# Patient Record
(demographics unavailable — no encounter records)

---

## 2024-10-10 NOTE — PHYSICAL EXAM
[FreeTextEntry1] : Head:  Normocephalic Neck: Supple nontender no carotid bruits.    Mental Status:  Alert Oriented X3 Speech normal and no aphasia or dysarthria.  Cranial Nerves:  PERRL, Fundi normal Visual Fields full  EOMI no diplopia no ptosis no nystagmus, V through XII intact.  Motor:  No drift, normal strength tone and coordination and no focal atrophy. No abnormal movements. No dysmetria.  Normal rapid alternating movements.   DTRs: Symmetric and 2+.  Plantars flexor.  No Clonus.  Sensory:  Normal testing with pin light touch and vibration and  Joint position sense.  Normal DSS to touch.  Gait:  Normal including tandem walking heel toe walking and Rhomberg.

## 2024-10-10 NOTE — ASSESSMENT
[FreeTextEntry1] : Impression: This 24-year-old right-handed female patient has a history of new onset headache beginning June 2024 for which she was seen in the [place] emergency room on 6/26/2024.  Description of the headaches suggest migraine without a definite aura.  Today's neurological exam is normal.  She was given Fioricet with limited benefit.  Recommendations: Discontinue Fioricet.  Begin sumatriptan 100 mg to be taken at onset and repeat in 2 hours as needed and as directed for recurrent headache.  She could take naproxen in the form of Aleve 1 to 2 tablets with sumatriptan as directed.  MRI of the brain noncontrast.  Office follow-up in 2 months or as needed.

## 2024-10-10 NOTE — HISTORY OF PRESENT ILLNESS
[FreeTextEntry1] : This patient is seen for an office consultation.  She is a 24-year-old right-handed female with a history of anxiety for which she receives Abilify.  She describes the onset in June 2024 headache which can occur in the average of 3 times a month.  When the headache occurs it can be severe 10/10.  She visited the emergency room on 6/26/2024 and was given intravenous medication with improvement.  She was prescribed Fioricet which she takes with a headache with incomplete relief.  The headache is usually biparietal and throbbing and can occur in the early morning or at the end of the day.  She can get a headache while using her computer at work.  There are no other specific triggering factors to the headache.  She had a routine ophthalmology exam in September 2024 and uses contact lenses.  She denies visual aura head trauma syncope seizure visual problems problems with speech or cognition or any motor or sensory complaints.  Past history other than anxiety treated with Abilify is basically unremarkable.  Family history remarkable for brother who has a diagnosis of seizure disorder though the medication has been discontinued and the etiology is unclear.

## 2024-10-23 NOTE — HISTORY OF PRESENT ILLNESS
[FreeTextEntry1] :  CPE  [de-identified] : Diet: healthy  Exercise: in the summer walking 2 miles per day (5,000 steps per day)  weight same since last physical in 2023    will be seeing [facility]   in interval -saw neurologist Dr. Walker, diagnosed with migraine headache - rx'd sumatriptan prn

## 2024-10-23 NOTE — ASSESSMENT
[FreeTextEntry1] : 24F c MARTA on abilify, migraine headache (2024), obesity here for cpe   check BW  physical ecg performed - NSR with Incomplete RBBB  advised FBSE with derm & can see for skin tag flu shot given today utd w/gyn exam & pap smear utd with neurology - diagnosed with migraine - going for MRI brain  advised to f/u with GI re: blood in stool upon wiping  rtc in 1year for cpe or prn

## 2024-10-23 NOTE — PHYSICAL EXAM
[No Acute Distress] : no acute distress [Well Nourished] : well nourished [PERRL] : pupils equal round and reactive to light [Normal Oropharynx] : the oropharynx was normal [Normal TMs] : both tympanic membranes were normal [Normal Nasal Mucosa] : the nasal mucosa was normal [No Lymphadenopathy] : no lymphadenopathy [Supple] : supple [Thyroid Normal, No Nodules] : the thyroid was normal and there were no nodules present [No Accessory Muscle Use] : no accessory muscle use [Clear to Auscultation] : lungs were clear to auscultation bilaterally [Regular Rhythm] : with a regular rhythm [Normal S1, S2] : normal S1 and S2 [No Murmur] : no murmur heard [No Edema] : there was no peripheral edema [Soft] : abdomen soft [Non Tender] : non-tender [Non-distended] : non-distended [No Masses] : no abdominal mass palpated [No HSM] : no HSM [Normal Bowel Sounds] : normal bowel sounds [Normal Supraclavicular Nodes] : no supraclavicular lymphadenopathy [Normal Posterior Cervical Nodes] : no posterior cervical lymphadenopathy [Normal Anterior Cervical Nodes] : no anterior cervical lymphadenopathy [Normal Affect] : the affect was normal [Normal Insight/Judgement] : insight and judgment were intact [de-identified] : deferred to gyn

## 2024-10-23 NOTE — HEALTH RISK ASSESSMENT
[Patient reported PAP Smear was normal] : Patient reported PAP Smear was normal [HIV test declined] : HIV test declined [PapSmearDate] : 9/24

## 2024-10-23 NOTE — COUNSELING
[____ min/wk Activity] : [unfilled] min/wk activity [Good understanding] : Patient has a good understanding of disease, goals and obesity follow-up plan [FreeTextEntry3] : needs to do mix of aerobic activity and walking if walking advised to do 15,000 steps 5 days per week  [FreeTextEntry4] : 15

## 2024-10-23 NOTE — REVIEW OF SYSTEMS
[Negative] : Integumentary [Abdominal Pain] : no abdominal pain [Nausea] : no nausea [Constipation] : no constipation [Diarrhea] : diarrhea [Vomiting] : no vomiting [Heartburn] : no heartburn [Melena] : no melena [FreeTextEntry7] : had 2nd episode of blood upon wiping

## 2025-02-19 NOTE — DISCUSSION/SUMMARY
[FreeTextEntry1] : - explained CT not being the best imaging modality for adnexa - TV[facility] referral rendered

## 2025-02-19 NOTE — HISTORY OF PRESENT ILLNESS
[FreeTextEntry1] : 25yo LMP 2/7/25 here for follow up.  Pt experienced a fall on Christmas day and as apart of her workup had a CT Abd/Pelvis that showed incidental finding of R ovrain cyst.

## 2025-02-19 NOTE — END OF VISIT
[TextEntry] :  I, Max Isaac, am scribing for and the presence of Dr. Leon the following sections HPI, Review of Systems, Past Medical, Family, Social History, Physical and Disposition

## 2025-07-24 NOTE — PHYSICAL EXAM
[No Acute Distress] : no acute distress [Well Nourished] : well nourished [Normal Affect] : the affect was normal [Normal Insight/Judgement] : insight and judgment were intact [PERRL] : pupils equal round and reactive to light [Normal Oropharynx] : the oropharynx was normal [Normal TMs] : both tympanic membranes were normal [Normal Nasal Mucosa] : the nasal mucosa was normal [No Lymphadenopathy] : no lymphadenopathy [Supple] : supple [Thyroid Normal, No Nodules] : the thyroid was normal and there were no nodules present [No Accessory Muscle Use] : no accessory muscle use [Clear to Auscultation] : lungs were clear to auscultation bilaterally [Regular Rhythm] : with a regular rhythm [Normal S1, S2] : normal S1 and S2 [No Murmur] : no murmur heard [No Edema] : there was no peripheral edema [Normal Appearance] : normal in appearance [No Nipple Discharge] : no nipple discharge [No Axillary Lymphadenopathy] : no axillary lymphadenopathy [Soft] : abdomen soft [Non Tender] : non-tender [Non-distended] : non-distended [No Masses] : no abdominal mass palpated [No HSM] : no HSM [Normal Bowel Sounds] : normal bowel sounds [Normal Supraclavicular Nodes] : no supraclavicular lymphadenopathy [Normal Axillary Nodes] : no axillary lymphadenopathy [Normal Posterior Cervical Nodes] : no posterior cervical lymphadenopathy [Normal Anterior Cervical Nodes] : no anterior cervical lymphadenopathy

## 2025-07-24 NOTE — COUNSELING
[Benefits of weight loss discussed] : Benefits of weight loss discussed [Decrease Portions] : decrease portions [____ min/wk Activity] : [unfilled] min/wk activity [Good understanding] : Patient has a good understanding of disease, goals and obesity follow-up plan [FreeTextEntry3] : eat healthier - mean prep [FreeTextEntry4] : 15

## 2025-07-24 NOTE — ASSESSMENT
[FreeTextEntry1] : IMPRESSION:  -  Hemoglobin 11.7 with a hematocrit of 37.2 on March 2025 -  LFTs unremarkable on March 2025 -  Transvaginal and pelvic ultrasound on March 2025 unremarkable -  Abdominal ultrasound for right upper quadrant pain on January 2023 normal

## 2025-07-24 NOTE — HISTORY OF PRESENT ILLNESS
[FreeTextEntry1] :  cpe  [de-identified] : Diet: so so- eats out for breakfast and lunch so could be better exercise: walks 5,-000-6,000 steps per day   MARTA - mood is good - off abilify x 2 months - mood is good - checks in with psychiatrist periodically   will re-establish care with GI

## 2025-07-24 NOTE — ASSESSMENT
[FreeTextEntry1] : 25F c MARAT off ability (5/25), migraine headache (2024), obesity here for cpe  [facility] - obtain fasting BW @ outside lab  ecg performed during wellness exam to monitor for any cardiac condition -- NSR with Incomplete RBBB UTD with FBSE with derm ([facility]) states utd with tdap shot  due for gyn exam & pap smear utd with optho exam - optho issue resolved, do not need to see neurology for this issue unless recurs   RTC in 4-6 months for obesity after lifestyle modifications - may consider medication for obesity at that time rtc in 1year for cpe or prn.

## 2025-07-24 NOTE — HISTORY OF PRESENT ILLNESS
No [FreeTextEntry1] :  cpe  [de-identified] : Diet: so so- eats out for breakfast and lunch so could be better exercise: walks 5,-000-6,000 steps per day   MARTA - mood is good - off abilify x 2 months - mood is good - checks in with psychiatrist periodically   will re-establish care with GI

## 2025-07-24 NOTE — ASSESSMENT
[FreeTextEntry1] : 25F c MARTA off ability (5/25), migraine headache (2024), obesity here for cpe  [facility] - obtain fasting BW @ outside lab  ecg performed during wellness exam to monitor for any cardiac condition -- NSR with Incomplete RBBB UTD with FBSE with derm ([facility]) states utd with tdap shot  due for gyn exam & pap smear utd with optho exam - optho issue resolved, do not need to see neurology for this issue unless recurs   RTC in 4-6 months for obesity after lifestyle modifications - may consider medication for obesity at that time rtc in 1year for cpe or prn.